# Patient Record
Sex: MALE | Race: WHITE | NOT HISPANIC OR LATINO | Employment: OTHER | URBAN - METROPOLITAN AREA
[De-identification: names, ages, dates, MRNs, and addresses within clinical notes are randomized per-mention and may not be internally consistent; named-entity substitution may affect disease eponyms.]

---

## 2018-08-15 NOTE — PROCEDURE NOTE: SURGICAL
<p>Prior to commencing surgery patient identification, surgical procedure, site, and side were confirmed by Dr. Suhas Fuentes. Following topical proparacaine anesthesia, the patient was positioned at the YAG laser, a contact lens coupled to the cornea with methylcellulose and an axial posterior capsulotomy performed without complication using 2.5 Mj x 37. Excess methylcellulose was washed from the eye, one drop of Alphagan was instilled and the patient returned to the holding area having tolerated the procedure well and without complication. </p><p>MRN:[id number]</p>

## 2019-03-20 NOTE — PROCEDURE NOTE: SURGICAL
<p>Prior to commencing surgery patient identification, surgical procedure, site, and side were confirmed by Dr. April Ellis. Following topical proparacaine anesthesia, the patient was positioned at the YAG laser, a contact lens coupled to the cornea with methylcellulose and an axial posterior capsulotomy performed without complication using 3.4 Mj x 35. Excess methylcellulose was washed from the eye, one drop of Alphagan was instilled and the patient returned to the holding area having tolerated the procedure well and without complication. </p><p>MRN:[id number]</p>

## 2019-06-19 NOTE — PATIENT DISCUSSION
Pt doing well discussed the effects on vision when eyes are dry. Did notice that when he was using [facility] vision was better. Also Pt ed regarding. eyes getting dry after reading for periods of time flying in an airplane etc.Recommend pt to use [employer]'s QID. Also discussed the focal point for best reading.

## 2020-02-24 ENCOUNTER — NEW PATIENT EMERGENCY (OUTPATIENT)
Dept: URBAN - METROPOLITAN AREA CLINIC 47 | Facility: CLINIC | Age: 70
End: 2020-02-24

## 2020-02-24 DIAGNOSIS — H00.016: ICD-10-CM

## 2020-02-24 PROCEDURE — 92002 INTRM OPH EXAM NEW PATIENT: CPT

## 2020-02-24 RX ORDER — DOXYCYCLINE 50 MG/1: 1 CAPSULE ORAL

## 2020-02-24 ASSESSMENT — VISUAL ACUITY
OS_SC: 20/20
OD_SC: 20/25-2

## 2020-08-17 NOTE — PATIENT DISCUSSION
Pt doing well discussed the effects on vision when eyes are dry. Did notice that when he was using [facility] vision was better. Also Pt ed regarding. eyes getting dry after reading for periods of time flying in an airplane etc.Recommend pt to use AT's QID. Also discussed the focal point for best reading.

## 2021-06-23 NOTE — PATIENT DISCUSSION
Pt doing well discussed the effects on vision when eyes are dry. Did notice that when he was using [facility] vision was better. Also Pt ed regarding. eyes getting dry after reading for periods of time flying in an airplane etc.Recommend pt to use AT's QID. Also discussed the focal point for best reading.

## 2022-01-21 ENCOUNTER — CONSULTATION/EVALUATION (OUTPATIENT)
Dept: URBAN - METROPOLITAN AREA CLINIC 39 | Facility: CLINIC | Age: 72
End: 2022-01-21

## 2022-01-21 VITALS — SYSTOLIC BLOOD PRESSURE: 142 MMHG | HEIGHT: 60 IN | DIASTOLIC BLOOD PRESSURE: 86 MMHG

## 2022-01-21 DIAGNOSIS — H35.411: ICD-10-CM

## 2022-01-21 DIAGNOSIS — Z98.890: ICD-10-CM

## 2022-01-21 DIAGNOSIS — H43.811: ICD-10-CM

## 2022-01-21 DIAGNOSIS — H35.373: ICD-10-CM

## 2022-01-21 PROCEDURE — 92134 CPTRZ OPH DX IMG PST SGM RTA: CPT

## 2022-01-21 PROCEDURE — 99214 OFFICE O/P EST MOD 30 MIN: CPT

## 2022-01-21 PROCEDURE — 92201 OPSCPY EXTND RTA DRAW UNI/BI: CPT

## 2022-01-21 ASSESSMENT — VISUAL ACUITY
OS_PH: 20/100-1
OS_SC: 20/400
OS_SC: J12
OD_SC: 20/40-2
OD_SC: J5

## 2022-01-21 ASSESSMENT — TONOMETRY
OS_IOP_MMHG: 14
OD_IOP_MMHG: 14

## 2022-02-08 ENCOUNTER — ESTABLISHED PATIENT (OUTPATIENT)
Dept: URBAN - METROPOLITAN AREA CLINIC 39 | Facility: CLINIC | Age: 72
End: 2022-02-08

## 2022-02-08 DIAGNOSIS — H43.811: ICD-10-CM

## 2022-02-08 DIAGNOSIS — H35.411: ICD-10-CM

## 2022-02-08 DIAGNOSIS — H35.373: ICD-10-CM

## 2022-02-08 PROCEDURE — 92134 CPTRZ OPH DX IMG PST SGM RTA: CPT

## 2022-02-08 PROCEDURE — 92014 COMPRE OPH EXAM EST PT 1/>: CPT

## 2022-02-08 ASSESSMENT — TONOMETRY
OS_IOP_MMHG: 14
OD_IOP_MMHG: 13

## 2022-02-08 ASSESSMENT — VISUAL ACUITY
OD_PH: 20/30+1
OD_SC: 20/40+2
OS_PH: 20/80-1
OS_SC: 20/100-1

## 2022-03-01 ENCOUNTER — ESTABLISHED PATIENT (OUTPATIENT)
Dept: URBAN - METROPOLITAN AREA CLINIC 39 | Facility: CLINIC | Age: 72
End: 2022-03-01

## 2022-03-01 DIAGNOSIS — H35.411: ICD-10-CM

## 2022-03-01 DIAGNOSIS — Z98.890: ICD-10-CM

## 2022-03-01 DIAGNOSIS — H35.373: ICD-10-CM

## 2022-03-01 DIAGNOSIS — H43.811: ICD-10-CM

## 2022-03-01 PROCEDURE — 92014 COMPRE OPH EXAM EST PT 1/>: CPT

## 2022-03-01 PROCEDURE — 92134 CPTRZ OPH DX IMG PST SGM RTA: CPT

## 2022-03-01 PROCEDURE — 92202 OPSCPY EXTND ON/MAC DRAW: CPT

## 2022-03-01 ASSESSMENT — TONOMETRY
OD_IOP_MMHG: 14
OS_IOP_MMHG: 13

## 2022-03-01 ASSESSMENT — VISUAL ACUITY
OD_SC: 20/30+2
OS_SC: 20/80-1

## 2022-04-05 ENCOUNTER — ESTABLISHED PATIENT (OUTPATIENT)
Dept: URBAN - METROPOLITAN AREA CLINIC 39 | Facility: CLINIC | Age: 72
End: 2022-04-05

## 2022-04-05 DIAGNOSIS — H35.373: ICD-10-CM

## 2022-04-05 DIAGNOSIS — Z98.890: ICD-10-CM

## 2022-04-05 DIAGNOSIS — H43.811: ICD-10-CM

## 2022-04-05 DIAGNOSIS — H35.411: ICD-10-CM

## 2022-04-05 PROCEDURE — 92014 COMPRE OPH EXAM EST PT 1/>: CPT

## 2022-04-05 PROCEDURE — 92202 OPSCPY EXTND ON/MAC DRAW: CPT

## 2022-04-05 PROCEDURE — 92134 CPTRZ OPH DX IMG PST SGM RTA: CPT

## 2022-04-05 ASSESSMENT — VISUAL ACUITY
OD_SC: 20/25+1
OS_SC: 20/70-1+2

## 2022-04-05 ASSESSMENT — TONOMETRY
OS_IOP_MMHG: 14
OD_IOP_MMHG: 15

## 2024-02-26 ENCOUNTER — EMERGENCY VISIT (OUTPATIENT)
Dept: URBAN - METROPOLITAN AREA CLINIC 38 | Facility: CLINIC | Age: 74
End: 2024-02-26

## 2024-02-26 DIAGNOSIS — H00.025: ICD-10-CM

## 2024-02-26 PROCEDURE — 99214 OFFICE O/P EST MOD 30 MIN: CPT

## 2024-02-26 RX ORDER — TOBRAMYCIN AND DEXAMETHASONE 1; 3 MG/ML; MG/ML: 1 SUSPENSION/ DROPS OPHTHALMIC

## 2024-02-26 RX ORDER — DOXYCYCLINE 100 MG/1: 1 CAPSULE ORAL TWICE A DAY

## 2024-02-26 ASSESSMENT — VISUAL ACUITY
OU_SC: 20/20-2
OD_SC: 20/20-1
OS_SC: 20/50-1

## 2024-02-26 ASSESSMENT — TONOMETRY
OD_IOP_MMHG: 17
OS_IOP_MMHG: 16
